# Patient Record
Sex: OTHER/UNKNOWN | Race: BLACK OR AFRICAN AMERICAN | URBAN - METROPOLITAN AREA
[De-identification: names, ages, dates, MRNs, and addresses within clinical notes are randomized per-mention and may not be internally consistent; named-entity substitution may affect disease eponyms.]

---

## 2019-07-31 ENCOUNTER — APPOINTMENT (OUTPATIENT)
Dept: GENERAL RADIOLOGY | Age: 84
End: 2019-07-31

## 2019-07-31 ENCOUNTER — HOSPITAL ENCOUNTER (EMERGENCY)
Age: 84
Discharge: LEFT BEFORE DISCHARGE | End: 2019-07-31
Attending: EMERGENCY MEDICINE

## 2019-07-31 ENCOUNTER — APPOINTMENT (OUTPATIENT)
Dept: CT IMAGING | Age: 84
End: 2019-07-31

## 2019-07-31 VITALS
WEIGHT: 135.14 LBS | OXYGEN SATURATION: 95 % | TEMPERATURE: 97.3 F | DIASTOLIC BLOOD PRESSURE: 64 MMHG | RESPIRATION RATE: 18 BRPM | HEART RATE: 98 BPM | SYSTOLIC BLOOD PRESSURE: 99 MMHG

## 2019-07-31 NOTE — ED NOTES
Bed: B-09  Expected date: 7/31/19  Expected time: 12:04 PM  Means of arrival: [facility]  Comments:  68 ams poss overdose      Staci Prasad RN  07/31/19 6986

## 2019-07-31 NOTE — ED PROVIDER NOTES
hallucinations. CRITICAL CARE TIME   Total Critical Care time was 30 minutes, excluding separately reportable procedures. There was a high probability of clinically significant/life threatening deterioration in the patient's condition which required my urgent intervention. CONSULTS:  None    PROCEDURES:  Unless otherwise noted below, none     Procedures        FINAL IMPRESSION      1. Opiate overdose, accidental or unintentional, initial encounter ([facility] [address]    2. Left against medical advice          DISPOSITION/PLAN   DISPOSITION        PATIENT REFERRED TO:  [facility]) Referral  Call [phone] for an appointment  Call today        DISCHARGE MEDICATIONS:  New Prescriptions    No medications on file     Controlled Substances Monitoring:     No flowsheet data found. (Please note that portions of this note were completed with a voice recognition program.  Efforts were made to edit the dictations but occasionally words are mis-transcribed. )    [id number] Swapnil Nichols DO (electronically signed)  Attending Emergency Physician          Caroline Cesar DO  07/31/19 7022

## 2019-07-31 NOTE — ED TRIAGE NOTES
Pt to room 9 via squad no identification on pt and pt unable to tell us information pt refusing to allow nursing to do any vital signs or any treatment. Pt continues to yell and tell staff to stop.  Pt intermittently falls asleep  When pt asked a question he yells  \"stop and hold on\"